# Patient Record
Sex: FEMALE | NOT HISPANIC OR LATINO | Employment: UNEMPLOYED | URBAN - METROPOLITAN AREA
[De-identification: names, ages, dates, MRNs, and addresses within clinical notes are randomized per-mention and may not be internally consistent; named-entity substitution may affect disease eponyms.]

---

## 2018-04-03 DIAGNOSIS — M77.8 TENDINITIS OF LEFT SHOULDER: Primary | ICD-10-CM

## 2018-04-03 DIAGNOSIS — M54.12 CERVICAL RADICULOPATHY: ICD-10-CM

## 2018-04-03 DIAGNOSIS — M75.22 BICIPITAL TENDINITIS OF LEFT SHOULDER: ICD-10-CM

## 2018-09-06 ENCOUNTER — CLINICAL SUPPORT (OUTPATIENT)
Dept: REHABILITATION | Facility: HOSPITAL | Age: 73
End: 2018-09-06
Attending: ORTHOPAEDIC SURGERY
Payer: MEDICARE

## 2018-09-06 DIAGNOSIS — R20.0 NUMBNESS AND TINGLING IN LEFT UPPER EXTREMITY: ICD-10-CM

## 2018-09-06 DIAGNOSIS — R20.2 NUMBNESS AND TINGLING IN LEFT UPPER EXTREMITY: ICD-10-CM

## 2018-09-06 DIAGNOSIS — G56.02 CARPAL TUNNEL SYNDROME ON LEFT: Primary | ICD-10-CM

## 2018-09-06 PROCEDURE — G8980 MOBILITY D/C STATUS: HCPCS | Mod: CK,PN

## 2018-09-06 PROCEDURE — G8984 CARRY CURRENT STATUS: HCPCS | Mod: CK,PN

## 2018-09-06 PROCEDURE — 97161 PT EVAL LOW COMPLEX 20 MIN: CPT | Mod: PN

## 2018-09-06 PROCEDURE — 97110 THERAPEUTIC EXERCISES: CPT | Mod: PN

## 2018-09-06 NOTE — PLAN OF CARE
"[facility]  Physical Therapy Initial Evaluation    Name: Grisel Nickerson  Clinic Number: [id number]    Therapy Diagnosis:   Encounter Diagnosis   Name Primary?    Numbness and tingling in left upper extremity      Physician: Araceli Jeffers MD    Physician Orders: PT Eval and Treat   Medical Diagnosis from Referral: Cervical radiculopathy  Evaluation Date: 9/6/2018  Authorization Period Expiration: 12/31/18  Plan of Care Expiration: 9/6/18  Visit # / Visits authorized: 1/ 50    Time In: 1610  Time Out: 1650  Total Billable Time: 40 minutes (LCE-1, TE-1)    Precautions: Standard, Diabetes and HTN    Subjective     Date of onset: 6 mos ago    History of current condition - Grisel reports: she has numbness and tingling in her L hand that started in her L thumb approximately 6 mos ago and is now experiencing tingling in entire L hand with shooting pains from L hand to her L shoulder. Pain is random, but does have increased pain with household chores such as sweeping and mopping or lifting something heavy. Denies neck pain. States she has cramping in her hand occasionally. Experiences L hand weakness with difficulty opening jars.        No past medical history on file.  Grisel Nickerson  has no past surgical history on file.    Grisel currently has no medications in their medication list.    Review of patient's allergies indicates:  Allergies not on file     Imaging, none:     Prior Therapy: Yes  Social History: lives with their spouse  Occupation: retired  Prior Level of Function: Independent  Current Level of Function: Independent    Pain:  Current 2/10, worst 8/10, best 0/10   Location: left arms, hands , shoulder  and wrists   Description: Tingling, Numb and Shooting  Aggravating Factors: Lifting  Easing Factors: rest    Pts goals: "get rid of pain"    Objective     Posture: sitting: slumped, forward head and rounded shoulders  Palpation: no significant TTP    Cervical AROM: " Pain/Dysfunction with Movement:   Flexion 40   Extension 60   Right side bending 35   Left side bending 35   Right rotation 60   Left rotation 60     Shoulder Right  Left  Pain/Dysfunction with Movement    AROM MMT AROM MMT    flexion WFL 5/5 WFL 5/5    abduction WFL 5/5 WFL 5/5    Internal rotation T10 4+/5 T10 4+/5    ER at 0° abd UT 4+/5 UT 4/5         (pounds):  R: 40.33  L: 39.33    Special Tests:  Tinel's sign: pos on L  Phalen's: pos on L      CMS Impairment/Limitation/Restriction for FOTO Neck Survey    Therapist reviewed FOTO scores for Grisel Nickerson on 9/6/2018.   FOTO documents entered into [employer] - see Media section.    Limitation Score: 41%  Category: Carrying    Current : CK = at least 40% but < 60% impaired, limited or restricted  Discharge: CK = at least 40% but < 60% impaired, limited or restricted         TREATMENT   Treatment Time In: 1642  Treatment Time Out: 1650  Total Treatment time separate from Evaluation: 8 minutes    Grisel received therapeutic exercises to restore mobilization of peripheral nerves for 8 minutes including:    Medial nerve glides  Carpal tunnel decompression    Home Exercises and Patient Education Provided    Education provided:   - will refer to OT    Written Home Exercises Provided: yes.  Exercises were reviewed and Grisel was able to demonstrate them prior to the end of the session.  Grisel demonstrated good  understanding of the education provided.     See EMR under Patient Instructions for exercises provided 9/6/2018.    Assessment   Grisel is a 73 y.o. female referred to outpatient Physical Therapy with a medical diagnosis of Cervical radiculopathy. Pt presents with signs and symptoms consistent with Carpal Tunnel Syndrome and will benefit from OT evaluation and treatment for L UE numbness and tingling.    Pt prognosis is Good.   Pt will benefit from skilled outpatient Physical Therapy to address the deficits stated above and in the chart below, provide pt/family  education, and to maximize pt's level of independence.     Plan of care discussed with patient: Yes  Pt's spiritual, cultural and educational needs considered and patient is agreeable to the plan of care and goals as stated below:     Anticipated Barriers for therapy: none    Medical Necessity is demonstrated by the following  History  Co-morbidities and personal factors that may impact the plan of care Co-morbidities:   - Angina, Arthritis, Diabetes Type I or II, Headaches, High Blood Pressure, Sleep  dysfunction    Personal Factors:   no deficits     high   Examination  Body Structures and Functions, activity limitations and participation restrictions that may impact the plan of care Body Regions:   head  neck  trunk  Upper extremities    Body Systems:    gross symmetry  ROM  strength    Participation Restrictions:   None stated    Activity limitations:   Learning and applying knowledge  no deficits    General Tasks and Commands  no deficits    Communication  no deficits    Mobility  lifting and carrying objects    Self care  no deficits    Domestic Life  doing house work (cleaning house, washing dishes, laundry)    Interactions/Relationships  no deficits    Life Areas  no deficits    Community and Social Life  no deficits         high   Clinical Presentation stable and uncomplicated low   Decision Making/ Complexity Score: low     Goals:  No goals will be established at this time.    Plan     Plan of care Certification: d/c from PT    Other: Pt would benefit from OT evaluation and treatment for L UE numbness and tingling    Maxine Molina, PT

## 2018-09-07 PROBLEM — R20.0 NUMBNESS AND TINGLING IN LEFT UPPER EXTREMITY: Status: ACTIVE | Noted: 2018-09-07

## 2018-09-07 PROBLEM — R20.2 NUMBNESS AND TINGLING IN LEFT UPPER EXTREMITY: Status: ACTIVE | Noted: 2018-09-07

## 2020-03-24 ENCOUNTER — DOCUMENTATION ONLY (OUTPATIENT)
Dept: REHABILITATION | Facility: HOSPITAL | Age: 75
End: 2020-03-24

## 2020-03-24 DIAGNOSIS — R20.2 NUMBNESS AND TINGLING OF LEFT UPPER EXTREMITY: ICD-10-CM

## 2020-03-24 DIAGNOSIS — R20.0 NUMBNESS AND TINGLING OF LEFT UPPER EXTREMITY: ICD-10-CM

## 2020-03-24 NOTE — PROGRESS NOTES
Pt was evaluated on 9/6/2018 and was seen 1 time for PT. Pt has not attended PT since 9/6/2018. Pt was given HEP. Current status is unknown. Pt to be d/c'd at this time.

## 2020-05-08 ENCOUNTER — LAB VISIT (OUTPATIENT)
Dept: PRIMARY CARE CLINIC | Facility: CLINIC | Age: 75
End: 2020-05-08
Payer: MEDICARE

## 2020-05-08 DIAGNOSIS — R05.9 COUGH: Primary | ICD-10-CM

## 2020-05-08 PROCEDURE — U0002 COVID-19 LAB TEST NON-CDC: HCPCS

## 2020-05-09 LAB — SARS-COV-2 RNA RESP QL NAA+PROBE: NOT DETECTED

## 2021-01-14 ENCOUNTER — IMMUNIZATION (OUTPATIENT)
Dept: FAMILY MEDICINE | Facility: CLINIC | Age: 76
End: 2021-01-14
Payer: MEDICARE

## 2021-01-14 DIAGNOSIS — Z23 NEED FOR VACCINATION: ICD-10-CM

## 2021-01-14 PROCEDURE — 91300 COVID-19, MRNA, LNP-S, PF, 30 MCG/0.3 ML DOSE VACCINE: CPT | Mod: PBBFAC | Performed by: FAMILY MEDICINE

## 2021-02-04 ENCOUNTER — IMMUNIZATION (OUTPATIENT)
Dept: FAMILY MEDICINE | Facility: CLINIC | Age: 76
End: 2021-02-04
Payer: MEDICARE

## 2021-02-04 DIAGNOSIS — Z23 NEED FOR VACCINATION: Primary | ICD-10-CM

## 2021-02-04 PROCEDURE — 0002A COVID-19, MRNA, LNP-S, PF, 30 MCG/0.3 ML DOSE VACCINE: CPT | Mod: PBBFAC | Performed by: FAMILY MEDICINE

## 2021-02-04 PROCEDURE — 91300 COVID-19, MRNA, LNP-S, PF, 30 MCG/0.3 ML DOSE VACCINE: CPT | Mod: PBBFAC | Performed by: FAMILY MEDICINE

## 2021-08-17 ENCOUNTER — LAB VISIT (OUTPATIENT)
Dept: PRIMARY CARE CLINIC | Facility: OTHER | Age: 76
End: 2021-08-17
Payer: MEDICARE

## 2021-08-17 DIAGNOSIS — R05.9 COUGH: ICD-10-CM

## 2021-08-17 PROCEDURE — U0003 INFECTIOUS AGENT DETECTION BY NUCLEIC ACID (DNA OR RNA); SEVERE ACUTE RESPIRATORY SYNDROME CORONAVIRUS 2 (SARS-COV-2) (CORONAVIRUS DISEASE [COVID-19]), AMPLIFIED PROBE TECHNIQUE, MAKING USE OF HIGH THROUGHPUT TECHNOLOGIES AS DESCRIBED BY CMS-2020-01-R: HCPCS | Performed by: INTERNAL MEDICINE

## 2021-08-18 LAB
SARS-COV-2 RNA RESP QL NAA+PROBE: NOT DETECTED
SARS-COV-2- CYCLE NUMBER: -1

## 2021-10-07 ENCOUNTER — IMMUNIZATION (OUTPATIENT)
Dept: INTERNAL MEDICINE | Facility: CLINIC | Age: 76
End: 2021-10-07
Payer: MEDICARE

## 2021-10-07 DIAGNOSIS — Z23 NEED FOR VACCINATION: Primary | ICD-10-CM

## 2021-10-07 PROCEDURE — 0003A COVID-19, MRNA, LNP-S, PF, 30 MCG/0.3 ML DOSE VACCINE: CPT | Mod: PBBFAC | Performed by: INTERNAL MEDICINE

## 2021-10-07 PROCEDURE — 91300 COVID-19, MRNA, LNP-S, PF, 30 MCG/0.3 ML DOSE VACCINE: CPT | Mod: PBBFAC | Performed by: INTERNAL MEDICINE

## 2022-04-14 ENCOUNTER — IMMUNIZATION (OUTPATIENT)
Dept: INTERNAL MEDICINE | Facility: CLINIC | Age: 77
End: 2022-04-14
Payer: MEDICARE

## 2022-04-14 DIAGNOSIS — Z23 NEED FOR VACCINATION: Primary | ICD-10-CM

## 2022-04-14 PROCEDURE — 0054A COVID-19, MRNA, LNP-S, PF, 30 MCG/0.3 ML DOSE VACCINE (PFIZER): CPT | Mod: PBBFAC | Performed by: INTERNAL MEDICINE

## 2023-11-25 ENCOUNTER — HOSPITAL ENCOUNTER (EMERGENCY)
Facility: HOSPITAL | Age: 78
Discharge: HOME | End: 2023-11-25
Attending: EMERGENCY MEDICINE
Payer: MEDICARE

## 2023-11-25 VITALS
TEMPERATURE: 98 F | HEART RATE: 67 BPM | BODY MASS INDEX: 19.32 KG/M2 | OXYGEN SATURATION: 96 % | SYSTOLIC BLOOD PRESSURE: 152 MMHG | HEIGHT: 62 IN | DIASTOLIC BLOOD PRESSURE: 84 MMHG | WEIGHT: 105 LBS | RESPIRATION RATE: 16 BRPM

## 2023-11-25 DIAGNOSIS — S09.90XA CLOSED HEAD INJURY, INITIAL ENCOUNTER: Primary | ICD-10-CM
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
## 2023-11-25 PROCEDURE — 99284 EMERGENCY DEPT VISIT MOD MDM: CPT | Mod: 25

## 2023-11-25 RX ORDER — ACETAMINOPHEN 325 MG/1
650 TABLET ORAL
Status: DISCONTINUED | OUTPATIENT
Start: 2023-11-25 | End: 2023-11-25 | Stop reason: HOSPADM

## 2023-11-26 NOTE — ED PROVIDER NOTES
Encounter Date: 11/25/2023       History     Chief Complaint   Patient presents with    Fall     Pt reports to ED with c/o hematoma to back of head s/t fall today around 1700. Pt reports falling back from standing position and hitting the back of her head. Pt denies LOC, AMS, dizziness, changes in vision. Pt reports taking daily aspirin.      HPI    78 y.o. female with history of CAD status post CABG, hypertension, CHF, PVD, diabetes, celiac stent, and recent SMA stent on 07/2 presents the ER for evaluation of fall close head injury.  Patient endorses that around 5:00 p.m., she fell from standing position hit the back of her head.  No loss of consciousness dizziness altered mental status.  Endorses posterior headache as well as hematoma palpated came the ER for further evaluation.  Patient is on aspirin, does not take Plavix based on Cardiology recommendation per patient.  .    Review of patient's allergies indicates:   Allergen Reactions    Percocet [oxycodone-acetaminophen] Hallucinations    Percodan [oxycodone-aspirin] Hallucinations    Bactrim [sulfamethoxazole-trimethoprim] Rash     No past medical history on file.  No past surgical history on file.  No family history on file.     Review of Systems   Neurological:  Positive for headaches.   All other systems reviewed and are negative.      Physical Exam     Initial Vitals [11/25/23 1928]   BP Pulse Resp Temp SpO2   (!) 216/86 76 16 98.3 °F (36.8 °C) 98 %      MAP       --         Physical Exam    Nursing note and vitals reviewed.  Constitutional: She appears well-developed and well-nourished. No distress.   HENT:   Head: Normocephalic.   Posterior head hematoma noted no signs of open or close skull fracture   Eyes: EOM are normal. Pupils are equal, round, and reactive to light.   Neck:   Normal range of motion.  Pulmonary/Chest: No respiratory distress.   Abdominal: She exhibits no distension.   Musculoskeletal:         General: Normal range of motion.       Cervical back: Normal range of motion.     Neurological: She is alert and oriented to person, place, and time. GCS score is 15. GCS eye subscore is 4. GCS verbal subscore is 5. GCS motor subscore is 6.   Skin: Skin is warm and dry. Capillary refill takes less than 2 seconds.   Psychiatric: She has a normal mood and affect. Thought content normal.         ED Course   Procedures  Labs Reviewed - No data to display       Imaging Results              CT Cervical Spine Without Contrast (Final result)  Result time 11/25/23 21:02:06      Final result by Janelle Sánchez MD (11/25/23 21:02:06)                   Impression:      No acute abnormality or malalignment.    Postoperative changes of fusion at C6-7.    No significant canal or neural foraminal stenosis.      Electronically signed by: Janelle Sánchez  Date:    11/25/2023  Time:    21:02               Narrative:    EXAMINATION:  CT CERVICAL SPINE WITHOUT CONTRAST    CLINICAL HISTORY:  Neck trauma (Age >= 65y);    TECHNIQUE:  Low dose axial images, sagittal and coronal reformations were performed though the cervical spine.  Contrast was not administered.    COMPARISON:  None    FINDINGS:  No acute fracture involving the cervical spine.  There is no malalignment.    Degenerative changes at the C1-2 articulation with pannus formation.  No stenosis.    C2-C3: No canal or neural foraminal stenosis.    C3-C4: Dorsal disc protrusion.  No significant canal or neural foraminal stenosis.    C4-C5: Broad-based disc protrusion.  No canal or significant neural foraminal stenosis.    C5-C6: Spondylosis with dorsal disc protrusion.  No significant canal or neural foraminal stenosis.    C6-C7: ACDF.  There is no canal stenosis.  There is mild bilateral neural foraminal stenosis related to uncovertebral joint and facet hypertrophy.    C7-T1: Spondylosis with dorsal disc osteophyte complex.  No canal or neural foraminal stenosis.    T1-T2: Dorsal annular calcification without  associated stenosis.    Imaged paraspinous structures and soft tissues are unremarkable.  Lung apices                                       CT Head Without Contrast (Final result)  Result time 11/25/23 20:39:15      Final result by Janelle Sánchez MD (11/25/23 20:39:15)                   Impression:      No acute intracranial abnormality or fracture.    Deep white matter low density as seen with microvascular chronic ischemic changes.    Scalp hematoma over the vertex.    Right frontotemporal craniotomy.      Electronically signed by: Janelle Sánchez  Date:    11/25/2023  Time:    20:39               Narrative:    EXAMINATION:  CT HEAD WITHOUT CONTRAST    CLINICAL HISTORY:  Head trauma, minor (Age >= 65y);    TECHNIQUE:  Low dose axial images were obtained through the head.  Coronal and sagittal reformations were also performed. Contrast was not administered.    COMPARISON:  None    FINDINGS:  There is no acute intracranial hemorrhage or hematoma. Gray-white matter junction differentiation is intact with no evidence of acute major arterial infarct. There is no mass or mass affect there is prominence of ventricles, cisterns and sulci as seen with senescent atrophic changes.  Posterior fossa structures pituitary gland are unremarkable for acute abnormality as imaged.  Patchy low density in the periventricular white matter noted.  Remote left cerebellar infarct.  Left basal ganglia lacunar type infarct is question to be remote.  Extensive vascular calcifications in the vertebrobasilar and intracranial.  Carotid arteries    There is a scalp hematoma over the vertex.  There is no underlying acute fracture.  Craniotomy in the right frontotemporal bone.  Visualized paranasal sinuses mastoid air cells middle ears are clear.                                       Medications - No data to display    Medical Decision Making  Pleasant 70-year-old female multiple medical problems presents the ER for evaluation of mechanical  fall.  Patient endorses that she went to get something from the ground and then fell backwards and hit her head no loss of consciousness but noticed posterior head hematoma.  Patient is on aspirin does not take Plavix.  Posterior head hematoma noted no spinal tenderness, neurologically intact acting appropriate.  Likely closed head injury, other possibility includes skull fracture, intracranial hemorrhage, C-spine fracture.  Will plan CT scan head and C-spine reassess likely discharge.    Differential includes closed head injury, skull fracture, intracranial hemorrhage, concussion, other cause    Amount and/or Complexity of Data Reviewed  Independent Historian: friend  External Data Reviewed: labs, radiology and notes.     Details: Previous hospitalizations, cardiology vascular surgery visits  Radiology: ordered and independent interpretation performed. Decision-making details documented in ED Course.    Risk  OTC drugs.               ED Course as of 11/26/23 0453   Sat Nov 25, 2023 2139 CT Head Without Contrast [SE]   2139 CT Cervical Spine Without Contrast  Patient resting comfortably in bed no acute distress.  Imaging reviewed, no sign of acute traumatic process, posttraumatic scalp hematoma noted.  Discussed with family diagnosis of fall closed head injury we discussed plan discharge home return precautions primary follow-up, family understood agreed plan patient be discharged. [SE]      ED Course User Index  [SE] Spring eBnoit MD                        Clinical Impression:  Final diagnoses:  [W19.XXXA] Fall, initial encounter  [S09.90XA] Closed head injury, initial encounter (Primary)          ED Disposition Condition    Discharge Stable          ED Prescriptions    None       Follow-up Information    None          Spring Benoit MD  11/26/23 0453